# Patient Record
Sex: MALE | Race: OTHER | HISPANIC OR LATINO
[De-identification: names, ages, dates, MRNs, and addresses within clinical notes are randomized per-mention and may not be internally consistent; named-entity substitution may affect disease eponyms.]

---

## 2020-12-13 ENCOUNTER — TRANSCRIPTION ENCOUNTER (OUTPATIENT)
Age: 55
End: 2020-12-13

## 2023-08-27 ENCOUNTER — EMERGENCY (EMERGENCY)
Facility: HOSPITAL | Age: 58
LOS: 1 days | Discharge: HOME | End: 2023-08-27
Attending: EMERGENCY MEDICINE
Payer: COMMERCIAL

## 2023-08-27 VITALS
RESPIRATION RATE: 18 BRPM | DIASTOLIC BLOOD PRESSURE: 82 MMHG | OXYGEN SATURATION: 96 % | SYSTOLIC BLOOD PRESSURE: 143 MMHG | HEART RATE: 74 BPM | WEIGHT: 195.77 LBS | TEMPERATURE: 98 F

## 2023-08-27 PROCEDURE — 99284 EMERGENCY DEPT VISIT MOD MDM: CPT

## 2023-08-27 PROCEDURE — 99283 EMERGENCY DEPT VISIT LOW MDM: CPT | Mod: 25

## 2023-08-27 PROCEDURE — 96372 THER/PROPH/DIAG INJ SC/IM: CPT

## 2023-08-27 PROCEDURE — 73562 X-RAY EXAM OF KNEE 3: CPT

## 2023-08-27 PROCEDURE — 73562 X-RAY EXAM OF KNEE 3: CPT | Mod: 26,LT

## 2023-08-27 RX ORDER — LIDOCAINE 4 G/100G
1 CREAM TOPICAL ONCE
Refills: 0 | Status: COMPLETED | OUTPATIENT
Start: 2023-08-27 | End: 2023-08-27

## 2023-08-27 RX ORDER — ACETAMINOPHEN 500 MG
975 TABLET ORAL ONCE
Refills: 0 | Status: COMPLETED | OUTPATIENT
Start: 2023-08-27 | End: 2023-08-27

## 2023-08-27 RX ORDER — KETOROLAC TROMETHAMINE 30 MG/ML
15 SYRINGE (ML) INJECTION ONCE
Refills: 0 | Status: DISCONTINUED | OUTPATIENT
Start: 2023-08-27 | End: 2023-08-27

## 2023-08-27 RX ADMIN — LIDOCAINE 1 PATCH: 4 CREAM TOPICAL at 23:52

## 2023-08-27 RX ADMIN — Medication 975 MILLIGRAM(S): at 23:52

## 2023-08-27 RX ADMIN — Medication 15 MILLIGRAM(S): at 23:56

## 2023-08-27 NOTE — ED PROVIDER NOTE - OBJECTIVE STATEMENT
58-year-old male with no past medical history presents with left knee pain.  Reports onset of mild pain 1 month ago.  Denies any trauma reports that in the last 24 hours pain worsen and swelling worsen.  Patient reports pain exacerbated by bending the knee.  Reports taking ibuprofen 6 AM and meloxicam 4 hours prior to ED evaluation.  Reports mild improvement of pain after medications.  Denies history of gout, or history joint swelling in the past.  Denies recent fevers.  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  Evaluation performed in Yoruba language by me.

## 2023-08-27 NOTE — ED PROVIDER NOTE - DIFFERENTIAL DIAGNOSIS
Osteoarthritis versus knee strain, low suspicion for septic arthritis or gout gouty arthritis Differential Diagnosis

## 2023-08-27 NOTE — ED PROVIDER NOTE - NSFOLLOWUPINSTRUCTIONS_ED_ALL_ED_FT
Take medication as prescribed. Use knee immobilizer and crutches as needed for knee pain.  Followup with orthopedic specialist for reevaluation.  Return to ED if you develop worsening symptoms-fever, severe knee swelling/redness.    [place] los medicamentos según lo prescrito. Utilice un inmovilizador de rodilla y muletas según sea necesario para el dolor de rodilla.  Seguimiento con especialista en ortopedia para reevaluación.  Regrese al servicio de urgencias si megha síntomas empeoran: fiebre, hinchazón o enrojecimiento severo de la rodilla.

## 2023-08-27 NOTE — ED PROVIDER NOTE - PHYSICAL EXAMINATION
MSK: Left knee–mild swelling noted over anterior left knee with minimal tenderness to palpation.  No warmth or erythema evident.  Passive and active knee flexion limited by pain.  No calf tenderness to palpation.  2+ DP pulses, cap refill less than 2 seconds at the distal toes.  Normal sensorimotor function of the left foot.

## 2023-08-27 NOTE — ED PROVIDER NOTE - PATIENT PORTAL LINK FT
You can access the FollowMyHealth Patient Portal offered by [facility] by registering at the following website: [email or web address]. By joining [facility]’s FollowMyHealth portal, you will also be able to view your health information using other applications (apps) compatible with our system.

## 2023-08-27 NOTE — ED PROVIDER NOTE - NSFOLLOWUPCLINICS_GEN_ALL_ED_FT
[facility]  Orthopedics  [address] [place] [address]  Phone: [phone]  Fax: [phone]

## 2023-08-28 PROBLEM — Z00.00 ENCOUNTER FOR PREVENTIVE HEALTH EXAMINATION: Status: ACTIVE | Noted: 2023-08-28

## 2023-08-28 RX ORDER — IBUPROFEN 200 MG
1 TABLET ORAL
Qty: 30 | Refills: 0
Start: 2023-08-28 | End: 2023-09-06

## 2023-08-28 RX ORDER — LIDOCAINE 4 G/100G
1 CREAM TOPICAL
Qty: 10 | Refills: 0
Start: 2023-08-28 | End: 2023-09-06

## 2023-08-28 RX ORDER — ACETAMINOPHEN 500 MG
3 TABLET ORAL
Qty: 60 | Refills: 0
Start: 2023-08-28 | End: 2023-09-01

## 2023-08-29 ENCOUNTER — APPOINTMENT (OUTPATIENT)
Dept: ORTHOPEDIC SURGERY | Facility: CLINIC | Age: 58
End: 2023-08-29
Payer: MEDICAID

## 2023-08-29 VITALS — WEIGHT: 190 LBS | BODY MASS INDEX: 25.73 KG/M2 | HEIGHT: 72 IN

## 2023-08-29 DIAGNOSIS — M65.9 SYNOVITIS AND TENOSYNOVITIS, UNSPECIFIED: ICD-10-CM

## 2023-08-29 DIAGNOSIS — Z78.9 OTHER SPECIFIED HEALTH STATUS: ICD-10-CM

## 2023-08-29 DIAGNOSIS — M22.2X1 PATELLOFEMORAL DISORDERS, RIGHT KNEE: ICD-10-CM

## 2023-08-29 PROCEDURE — 73562 X-RAY EXAM OF KNEE 3: CPT | Mod: RT

## 2023-08-29 PROCEDURE — 20610 DRAIN/INJ JOINT/BURSA W/O US: CPT | Mod: LT

## 2023-08-29 PROCEDURE — 99204 OFFICE O/P NEW MOD 45 MIN: CPT | Mod: 25

## 2023-08-29 RX ORDER — MELOXICAM 15 MG/1
15 TABLET ORAL
Qty: 30 | Refills: 1 | Status: ACTIVE | COMMUNITY
Start: 2023-08-29 | End: 1900-01-01

## 2023-08-29 NOTE — IMAGING
[Bilateral] : knee bilaterally [All Views] : anteroposterior, lateral, skyline, and anteroposterior standing [Outside films reviewed] : Outside films reviewed [There are no fractures, subluxations or dislocations. No significant abnormalities are seen] : There are no fractures, subluxations or dislocations. No significant abnormalities are seen [Mild tricompartmental OA medial narrowing] : Mild tricompartmental OA medial narrowing [Mild patellofemoral OA] : Mild patellofemoral OA [de-identified] : Bilateral Knee Exam:                         	   General: no distress, no ligamentous laxity Skin: no significant pertinent finding Inspection:   Effusion: (mod)  Malalignment: (-)  Swelling: (-)  Quad atrophy: (-)  J-sign: (-) ROM:   0 - 130 degrees of flexion Right. 5 - 85 flexion Left Tenderness:   MJLT: (+) Right  MFC. (-)  LJLT: (+)  Medial patellar facet tenderness: -  Lateral patellar facet tenderness: -  Crepitus: (+)  Patellar grind tenderness: (-)  Patellar tendon: (-) Stability:   Lachman: (-)  Varus/Valgus instability: (-)  Posterior drawer: (-)  Patellar translation: wnl Additional tests:   McMurrays test: (+)  Patellar apprehension: (-)  Patellar tilt: (-)  Tight lateral retinaculum: (-) Strength: 5/5 Q/H/TA/GS/EHL Neuro: In tact to light touch throughout, DTR's wnl Vascularity: Extremity warm and well perfused Gait: non antalgic

## 2023-08-29 NOTE — DISCUSSION/SUMMARY
[Medication Risks Reviewed] : Medication risks reviewed [de-identified] : Discussed options, CSI in left knee. Naproxen Obtain MRI for left knee rule out LMT.   ----------------------------------------------------------------------------  Large joint corticosteroid injection given: Left knee  Patient indicated for injection after trial of rest, OTC medications including aspirin, Ibuprofen, Aleve etc or prescription NSAIDS, and/or exercises at home and/ or physical therapy without satisfactory response.  Patient has symptoms including pain, swelling, and/or decreased mobility in the joint. The risks, benefits, and alternatives to corticosteroid injection were explained in full to the patient, including but not limited to infection, sepsis, bleeding, scarring, skin discoloration, temporary increase in pain, syncopal episode, failure to resolve symptoms, allergic reaction, symptom recurrence, and elevation of blood sugar in diabetics. Patient understood the risks. All questions were answered. After discussion of options, patient requested an injection.   Oral informed consent was obtained and sterile technique was utilized for the procedure including the preparation of the solutions used for the injection and betadine followed by alcohol prep to the injection site. Anesthesia was given with ethyl chloride sprayed topically. The injection was delivered. Patient tolerated the procedure well.   Post Procedure Instructions: Patient was advised to call if redness, pain, or fever occur and apply ice for 15 min on and 15 min off later today  Medications delivered: Kenalog 10 mg, Lidocaine: 4 cc  ----------------------------------------------------------------------------  Patient warned of specific risks of medication related to bleeding, GI issues, increase blood pressure, and cardiac risks in addition to additional risks.  Patient advised to discuss with PMD  if any presence of stated issues.  ----------------------------------------------------------------------------  All relevant imaging studies pertinent to today's visit, including x-rays, MRI's and/or other advanced imaging studies (CT/etc) were independently interpreted and reviewed with the patient as needed. Implications of the studies together with the patient's clinical picture were discussed to formulate a working diagnosis and management options were detailed.  The patient was advised of the diagnosis.  The natural history of the pathology was explained in full. All questions were answered.  The risks and benefits of conservative and interventional treatment alternatives were explained to the patient  ----------------------------------------------------------------------------  The patient was advised that an advanced diagnostic/imaging study (MRI/CT/etc) will be ordered to evaluate potential pathology in the affected area(s).  The patient was further advised to follow up in the office to review the results of the study and determine further management that may be indicated.

## 2023-08-29 NOTE — HISTORY OF PRESENT ILLNESS
[Gradual] : gradual [8] : 8 [5] : 5 [Dull/Aching] : dull/aching [Localized] : localized [Sharp] : sharp [Constant] : constant [Leisure] : leisure [Rest] : rest [Meds] : meds [de-identified] : 58 year old patient is here for an evaluation for Bilateral knees. Patient presents with knee pain, L knee>R knee. 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 Pain has gradually increased. Acute pain for 2 days in the left knee, with no injury. Unable to bend and extend with swelling and stiffness. Notices clicking/ clunking. Patient went to the hospital due to excessive pain, where they gave him an injection, was prescribed pain meds. Xray were done but results were normal.  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 pressure on the knee [de-identified] : Xray done by [facility] in [place].

## 2023-09-19 ENCOUNTER — APPOINTMENT (OUTPATIENT)
Dept: ORTHOPEDIC SURGERY | Facility: CLINIC | Age: 58
End: 2023-09-19
Payer: COMMERCIAL

## 2023-09-19 VITALS — HEIGHT: 72 IN | WEIGHT: 190 LBS | BODY MASS INDEX: 25.73 KG/M2

## 2023-09-19 DIAGNOSIS — M23.307 OTHER MENISCUS DERANGEMENTS, UNSPECIFIED MENISCUS, LEFT KNEE: ICD-10-CM

## 2023-09-19 DIAGNOSIS — M25.562 PAIN IN LEFT KNEE: ICD-10-CM

## 2023-09-19 PROCEDURE — 99214 OFFICE O/P EST MOD 30 MIN: CPT

## 2023-10-24 PROBLEM — M25.562 LEFT MEDIAL KNEE PAIN: Status: ACTIVE | Noted: 2023-08-29

## 2023-10-24 PROBLEM — M23.307 DERANGEMENT OF MENISCUS OF LEFT KNEE: Status: ACTIVE | Noted: 2023-10-24

## 2025-04-07 NOTE — ED PROVIDER NOTE - CLINICAL SUMMARY MEDICAL DECISION MAKING FREE TEXT BOX
The patient has been examined and the H&P has been reviewed:    I concur with the findings and no changes have occurred since H&P was written.    Procedure risks, benefits and alternative options discussed and understood by patient/family.    ASA 2, mallampati 2        There are no hospital problems to display for this patient.    
58-year-old male with no past medical history presents with left knee pain.   imaging interpretation- XR shows no displaced fracture/dislocation  Discussed above with patient/family. On reeval after meds  Knee immobilizer placed and given crutches.  patient stable for discharge to followup with orthopedic specialist.
